# Patient Record
Sex: FEMALE | URBAN - METROPOLITAN AREA
[De-identification: names, ages, dates, MRNs, and addresses within clinical notes are randomized per-mention and may not be internally consistent; named-entity substitution may affect disease eponyms.]

---

## 2022-12-20 ENCOUNTER — OFFICE VISIT (OUTPATIENT)
Dept: FAMILY MEDICINE CLINIC | Age: 32
End: 2022-12-20

## 2022-12-20 VITALS
TEMPERATURE: 97.3 F | WEIGHT: 185 LBS | RESPIRATION RATE: 16 BRPM | SYSTOLIC BLOOD PRESSURE: 110 MMHG | DIASTOLIC BLOOD PRESSURE: 76 MMHG | OXYGEN SATURATION: 98 % | HEART RATE: 91 BPM | BODY MASS INDEX: 29.73 KG/M2 | HEIGHT: 66 IN

## 2022-12-20 DIAGNOSIS — Z11.4 ENCOUNTER FOR SCREENING FOR HIV: ICD-10-CM

## 2022-12-20 DIAGNOSIS — F17.200 SMOKER: ICD-10-CM

## 2022-12-20 DIAGNOSIS — Z76.89 ENCOUNTER TO ESTABLISH CARE: ICD-10-CM

## 2022-12-20 DIAGNOSIS — Z11.59 NEED FOR HEPATITIS C SCREENING TEST: ICD-10-CM

## 2022-12-20 DIAGNOSIS — M25.511 CHRONIC PAIN OF BOTH SHOULDERS: ICD-10-CM

## 2022-12-20 DIAGNOSIS — G89.29 CHRONIC BILATERAL LOW BACK PAIN WITH BILATERAL SCIATICA: ICD-10-CM

## 2022-12-20 DIAGNOSIS — M25.512 CHRONIC PAIN OF BOTH SHOULDERS: ICD-10-CM

## 2022-12-20 DIAGNOSIS — E78.5 HYPERLIPIDEMIA, UNSPECIFIED HYPERLIPIDEMIA TYPE: ICD-10-CM

## 2022-12-20 DIAGNOSIS — Z00.00 ROUTINE ADULT HEALTH MAINTENANCE: Primary | ICD-10-CM

## 2022-12-20 DIAGNOSIS — F41.9 ANXIETY AND DEPRESSION: ICD-10-CM

## 2022-12-20 DIAGNOSIS — Z23 NEED FOR TDAP VACCINATION: ICD-10-CM

## 2022-12-20 DIAGNOSIS — G89.29 CHRONIC PAIN OF BOTH SHOULDERS: ICD-10-CM

## 2022-12-20 DIAGNOSIS — M54.41 CHRONIC BILATERAL LOW BACK PAIN WITH BILATERAL SCIATICA: ICD-10-CM

## 2022-12-20 DIAGNOSIS — F32.A ANXIETY AND DEPRESSION: ICD-10-CM

## 2022-12-20 DIAGNOSIS — Z23 FLU VACCINE NEED: ICD-10-CM

## 2022-12-20 DIAGNOSIS — M72.2 PLANTAR FASCIITIS: ICD-10-CM

## 2022-12-20 DIAGNOSIS — M54.42 CHRONIC BILATERAL LOW BACK PAIN WITH BILATERAL SCIATICA: ICD-10-CM

## 2022-12-20 LAB — HEPATITIS C ANTIBODY INTERPRETATION: NORMAL

## 2022-12-20 RX ORDER — BUSPIRONE HYDROCHLORIDE 15 MG/1
TABLET ORAL
COMMUNITY
Start: 2022-06-27 | End: 2022-12-20 | Stop reason: SDUPTHER

## 2022-12-20 RX ORDER — HYDROXYZINE PAMOATE 25 MG/1
25 CAPSULE ORAL 3 TIMES DAILY PRN
Qty: 90 CAPSULE | Refills: 0 | Status: SHIPPED | OUTPATIENT
Start: 2022-12-20

## 2022-12-20 RX ORDER — BUSPIRONE HYDROCHLORIDE 15 MG/1
15 TABLET ORAL 2 TIMES DAILY
Qty: 90 TABLET | Refills: 0 | Status: SHIPPED | OUTPATIENT
Start: 2022-12-20

## 2022-12-20 RX ORDER — SERTRALINE HYDROCHLORIDE 100 MG/1
100 TABLET, FILM COATED ORAL DAILY
COMMUNITY
Start: 2022-07-06 | End: 2022-12-20 | Stop reason: SDUPTHER

## 2022-12-20 RX ORDER — HYDROXYZINE PAMOATE 25 MG/1
25 CAPSULE ORAL 3 TIMES DAILY PRN
COMMUNITY
Start: 2022-06-03 | End: 2022-12-20 | Stop reason: SDUPTHER

## 2022-12-20 RX ORDER — MELOXICAM 7.5 MG/1
7.5 TABLET ORAL DAILY PRN
Qty: 30 TABLET | Refills: 0 | Status: SHIPPED | OUTPATIENT
Start: 2022-12-20

## 2022-12-20 RX ORDER — SERTRALINE HYDROCHLORIDE 100 MG/1
100 TABLET, FILM COATED ORAL DAILY
Qty: 45 TABLET | Refills: 0 | Status: SHIPPED | OUTPATIENT
Start: 2022-12-20

## 2022-12-20 RX ORDER — CYCLOBENZAPRINE HCL 10 MG
10 TABLET ORAL NIGHTLY PRN
Qty: 30 TABLET | Refills: 0 | Status: SHIPPED | OUTPATIENT
Start: 2022-12-20

## 2022-12-20 SDOH — ECONOMIC STABILITY: FOOD INSECURITY: WITHIN THE PAST 12 MONTHS, THE FOOD YOU BOUGHT JUST DIDN'T LAST AND YOU DIDN'T HAVE MONEY TO GET MORE.: NEVER TRUE

## 2022-12-20 SDOH — ECONOMIC STABILITY: HOUSING INSECURITY
IN THE LAST 12 MONTHS, WAS THERE A TIME WHEN YOU DID NOT HAVE A STEADY PLACE TO SLEEP OR SLEPT IN A SHELTER (INCLUDING NOW)?: NO

## 2022-12-20 SDOH — ECONOMIC STABILITY: TRANSPORTATION INSECURITY
IN THE PAST 12 MONTHS, HAS LACK OF TRANSPORTATION KEPT YOU FROM MEETINGS, WORK, OR FROM GETTING THINGS NEEDED FOR DAILY LIVING?: NO

## 2022-12-20 SDOH — ECONOMIC STABILITY: TRANSPORTATION INSECURITY
IN THE PAST 12 MONTHS, HAS THE LACK OF TRANSPORTATION KEPT YOU FROM MEDICAL APPOINTMENTS OR FROM GETTING MEDICATIONS?: NO

## 2022-12-20 SDOH — ECONOMIC STABILITY: FOOD INSECURITY: WITHIN THE PAST 12 MONTHS, YOU WORRIED THAT YOUR FOOD WOULD RUN OUT BEFORE YOU GOT MONEY TO BUY MORE.: NEVER TRUE

## 2022-12-20 SDOH — ECONOMIC STABILITY: INCOME INSECURITY: IN THE LAST 12 MONTHS, WAS THERE A TIME WHEN YOU WERE NOT ABLE TO PAY THE MORTGAGE OR RENT ON TIME?: NO

## 2022-12-20 ASSESSMENT — ANXIETY QUESTIONNAIRES
IF YOU CHECKED OFF ANY PROBLEMS ON THIS QUESTIONNAIRE, HOW DIFFICULT HAVE THESE PROBLEMS MADE IT FOR YOU TO DO YOUR WORK, TAKE CARE OF THINGS AT HOME, OR GET ALONG WITH OTHER PEOPLE: EXTREMELY DIFFICULT
3. WORRYING TOO MUCH ABOUT DIFFERENT THINGS: 3
1. FEELING NERVOUS, ANXIOUS, OR ON EDGE: 3
4. TROUBLE RELAXING: 3
2. NOT BEING ABLE TO STOP OR CONTROL WORRYING: 1
6. BECOMING EASILY ANNOYED OR IRRITABLE: 3
5. BEING SO RESTLESS THAT IT IS HARD TO SIT STILL: 3
7. FEELING AFRAID AS IF SOMETHING AWFUL MIGHT HAPPEN: 3
GAD7 TOTAL SCORE: 19

## 2022-12-20 ASSESSMENT — PATIENT HEALTH QUESTIONNAIRE - PHQ9
SUM OF ALL RESPONSES TO PHQ QUESTIONS 1-9: 24
SUM OF ALL RESPONSES TO PHQ QUESTIONS 1-9: 24
SUM OF ALL RESPONSES TO PHQ9 QUESTIONS 1 & 2: 6
4. FEELING TIRED OR HAVING LITTLE ENERGY: 3
10. IF YOU CHECKED OFF ANY PROBLEMS, HOW DIFFICULT HAVE THESE PROBLEMS MADE IT FOR YOU TO DO YOUR WORK, TAKE CARE OF THINGS AT HOME, OR GET ALONG WITH OTHER PEOPLE: 3
3. TROUBLE FALLING OR STAYING ASLEEP: 3
7. TROUBLE CONCENTRATING ON THINGS, SUCH AS READING THE NEWSPAPER OR WATCHING TELEVISION: 3
5. POOR APPETITE OR OVEREATING: 3
2. FEELING DOWN, DEPRESSED OR HOPELESS: 3
8. MOVING OR SPEAKING SO SLOWLY THAT OTHER PEOPLE COULD HAVE NOTICED. OR THE OPPOSITE, BEING SO FIGETY OR RESTLESS THAT YOU HAVE BEEN MOVING AROUND A LOT MORE THAN USUAL: 3
SUM OF ALL RESPONSES TO PHQ QUESTIONS 1-9: 24
SUM OF ALL RESPONSES TO PHQ QUESTIONS 1-9: 24
6. FEELING BAD ABOUT YOURSELF - OR THAT YOU ARE A FAILURE OR HAVE LET YOURSELF OR YOUR FAMILY DOWN: 3
9. THOUGHTS THAT YOU WOULD BE BETTER OFF DEAD, OR OF HURTING YOURSELF: 0
1. LITTLE INTEREST OR PLEASURE IN DOING THINGS: 3

## 2022-12-20 ASSESSMENT — ENCOUNTER SYMPTOMS
CONSTIPATION: 0
TROUBLE SWALLOWING: 0
NAUSEA: 0
DIARRHEA: 0
WHEEZING: 0
VOMITING: 0
BACK PAIN: 1
SHORTNESS OF BREATH: 0
BLOOD IN STOOL: 0
COUGH: 0
ABDOMINAL PAIN: 0

## 2022-12-20 ASSESSMENT — COLUMBIA-SUICIDE SEVERITY RATING SCALE - C-SSRS
2. HAVE YOU ACTUALLY HAD ANY THOUGHTS OF KILLING YOURSELF?: NO
1. WITHIN THE PAST MONTH, HAVE YOU WISHED YOU WERE DEAD OR WISHED YOU COULD GO TO SLEEP AND NOT WAKE UP?: YES
6. HAVE YOU EVER DONE ANYTHING, STARTED TO DO ANYTHING, OR PREPARED TO DO ANYTHING TO END YOUR LIFE?: NO

## 2022-12-20 ASSESSMENT — SOCIAL DETERMINANTS OF HEALTH (SDOH): HOW HARD IS IT FOR YOU TO PAY FOR THE VERY BASICS LIKE FOOD, HOUSING, MEDICAL CARE, AND HEATING?: SOMEWHAT HARD

## 2022-12-20 NOTE — PROGRESS NOTES
Emily Anna  YOB: 1990    Date of Service:  12/20/2022    Chief Complaint:   Emily Anna is a 34 y.o. female who presents for complete physical examination. Concerns:   Chief Complaint   Patient presents with    New Patient    Back Pain     Left side and foot pain does go to both sides       HPI:  34year old female presents with multiple concerns and to establish care. Back pain  Patient endorses back pain that has been present since October 2022. Radiates from her neck down to her lower extremities. Recently started a new job which involves more bending and reaching. Problem occurs constantly worse on the left side. Has been worsening since onset. Present and thoracic, lumbar and sacroiliac spine. Described as a constant ache. Pain radiates from lower back to right and left thigh. Pain is rated 7 out of 10 in severity. Worse during the night. Worsens with bending, position and standing. Endorses paresthesias and tingling in her arms and forearms. Her extremities. Denies any abdominal pain, bowel or bladder incontinence with pain, dysuria, fever, eye pain, pelvic pain, perianal numbness and weight loss. She has tried NSAIDs and heat for symptoms. Treatment provided little improvement in pain    Foot pain  Patient endorses a history of plantar fasciitis 9 months ago. Worse in her left foot. Patient has tried inserts in her shoes and massage. Pain is rated 10 out of 10 in severity. Anxiety and depression   History of anxiety diagnosed in her 25s. Previously on Zoloft 100 mg, Buspar 15 mg twice daily and hydroxyzine PRN for panic attacks. Patient reports he has been doing well on the medications. Denies any side effects. Has been on the medication for at least 2 to 3 months. Currently not taking any medications. Denies any suicidal or homicidal ideation. Date of last physical: over 1 year.      Recent illnesses/hospitalizations - none   Tobacco - 1/2 ppd, 12 years   Alcohol - 3 drinks/week   Drugs - Marijuana daily   Mood - phq 2 of 6  PHQ-9  12/20/2022   Little interest or pleasure in doing things 3   Feeling down, depressed, or hopeless 3   Trouble falling or staying asleep, or sleeping too much 3   Feeling tired or having little energy 3   Poor appetite or overeating 3   Feeling bad about yourself - or that you are a failure or have let yourself or your family down 3   Trouble concentrating on things, such as reading the newspaper or watching television 3   Moving or speaking so slowly that other people could have noticed. Or the opposite - being so fidgety or restless that you have been moving around a lot more than usual 3   Thoughts that you would be better off dead, or of hurting yourself in some way 0   PHQ-2 Score 6   PHQ-9 Total Score 24   If you checked off any problems, how difficult have these problems made it for you to do your work, take care of things at home, or get along with other people? 3     Sexually active - not currently   Menses - Patient's last menstrual period was 11/23/2022 (approximate). Regular periods   History of contraception - never   Diet - Balanced, none   Exercise - works   Occupation: Works in a [employer]  Lives at home with 2 roommates  Body mass index is 30.32 kg/m². Health Maintenance   HIV screen - Never   Hep C Screen - Never   Hx abnormal PAP: no, Her last pap smear was > 5 years  Last tetanus booster:   Last eye exam: > 1 year  Last dental exam: >1 year    Vaccines  Tdap vaccine > 10 years   COVID 19-vaccine- JJ 1 dose   Flu vaccine - none      Patient's medications, allergies, past medical, surgical, social and family histories were reviewed and updated as appropriate. Wt Readings from Last 3 Encounters:   12/20/22 185 lb (83.9 kg)     BP Readings from Last 3 Encounters:   12/20/22 110/76     No Known Allergies  History reviewed. No pertinent past medical history.   Outpatient Medications Marked as Taking for the 12/20/22 encounter (Office Visit) with Jacinta Felipe MD   Medication Sig Dispense Refill    nicotine polacrilex (NICORETTE) 4 MG gum Take 1 each by mouth every 2 hours as needed for Smoking cessation 190 each 0    busPIRone (BUSPAR) 15 MG tablet Take 15 mg by mouth in the morning and at bedtime 90 tablet 0    hydrOXYzine pamoate (VISTARIL) 25 MG capsule Take 1 capsule by mouth 3 times daily as needed for Anxiety 90 capsule 0    sertraline (ZOLOFT) 100 MG tablet Take 1 tablet by mouth daily 45 tablet 0    cyclobenzaprine (FLEXERIL) 10 MG tablet Take 1 tablet by mouth nightly as needed for Muscle spasms (may cause drowsiness) 30 tablet 0    meloxicam (MOBIC) 7.5 MG tablet Take 1 tablet by mouth daily as needed for Pain 30 tablet 0     There is no problem list on file for this patient.     Health Maintenance   Topic Date Due    Varicella vaccine (1 of 2 - 2-dose childhood series) Never done    Pneumococcal 0-64 years Vaccine (1 - PCV) Never done    Cervical cancer screen  Never done    COVID-19 Vaccine (2 - Booster for Rafi series) 07/23/2021    Depression Monitoring  12/20/2023    DTaP/Tdap/Td vaccine (8 - Td or Tdap) 12/20/2032    Hib vaccine  Completed    Flu vaccine  Completed    Hepatitis C screen  Completed    HIV screen  Completed    Hepatitis A vaccine  Aged Out    Meningococcal (ACWY) vaccine  Aged Out     Immunization History   Administered Date(s) Administered    COVID-19, J&J, (age 18y+), IM, 0.5 mL 05/28/2021    DTP 02/22/1990, 04/23/1990, 06/26/1990, 05/31/1991, 07/17/1995    Hepatitis B Ped/Adol (Engerix-B, Recombivax HB) 05/16/2002, 07/03/2002, 10/28/2002    Hib vaccine 02/28/1991    Influenza, FLUARIX, FLULAVAL, FLUZONE (age 10 mo+) AND AFLURIA, (age 1 y+), PF, 0.5mL 10/24/2018, 11/26/2019    Influenza, FLUCELVAX, (age 10 mo+), MDCK, PF, 0.5mL 12/20/2022    MMR 06/14/1991, 05/16/2002    Polio IPV (IPOL) 1989, 02/13/1990, 08/01/1991, 08/23/1995    Td vaccine (adult) 11/25/2002    Tdap (Boostrix, Adacel) 03/08/2019, 12/20/2022    Tetanus Toxoid, absorbed 09/08/2003       History reviewed. No pertinent surgical history. History reviewed. No pertinent family history. Social History     Socioeconomic History    Marital status: Single     Spouse name: Not on file    Number of children: Not on file    Years of education: Not on file    Highest education level: Not on file   Occupational History    Not on file   Tobacco Use    Smoking status: Every Day     Packs/day: 0.50     Types: Cigarettes     Start date: 12/5/2010    Smokeless tobacco: Never   Substance and Sexual Activity    Alcohol use: Yes     Alcohol/week: 3.0 standard drinks     Types: 3 Cans of beer per week     Comment: 3 cans per week    Drug use: Yes     Types: Marijuana Garon Johnson)    Sexual activity: Not on file   Other Topics Concern    Not on file   Social History Narrative    Not on file     Social Determinants of Health     Financial Resource Strain: Medium Risk    Difficulty of Paying Living Expenses: Somewhat hard   Food Insecurity: No Food Insecurity    Worried About Running Out of Food in the Last Year: Never true    Ran Out of Food in the Last Year: Never true   Transportation Needs: No Transportation Needs    Lack of Transportation (Medical): No    Lack of Transportation (Non-Medical): No   Physical Activity: Not on file   Stress: Not on file   Social Connections: Not on file   Intimate Partner Violence: Not on file   Housing Stability: Unknown    Unable to Pay for Housing in the Last Year: No    Number of Places Lived in the Last Year: Not on file    Unstable Housing in the Last Year: No       Reviewof Systems:  Review of Systems   Constitutional:  Positive for fatigue. Negative for diaphoresis, fever and unexpected weight change. HENT:  Negative for ear pain, hearing loss and trouble swallowing. Eyes:  Negative for visual disturbance. Respiratory:  Negative for cough, shortness of breath and wheezing.     Cardiovascular: Negative for chest pain, palpitations and leg swelling. Gastrointestinal:  Negative for abdominal pain, blood in stool, constipation, diarrhea, nausea and vomiting. Genitourinary:  Negative for dysuria, pelvic pain, vaginal bleeding and vaginal discharge. Musculoskeletal:  Positive for back pain. Negative for arthralgias and joint swelling. Skin:  Negative for rash. Neurological:  Positive for weakness. Negative for dizziness, light-headedness and numbness. Psychiatric/Behavioral:  Positive for decreased concentration and sleep disturbance (5-6 hours). Negative for hallucinations, self-injury and suicidal ideas. The patient is nervous/anxious. PhysicalExam:   /76   Pulse 91   Temp 97.3 °F (36.3 °C)   Resp 16   Ht 5' 5.5\" (1.664 m)   Wt 185 lb (83.9 kg)   LMP 11/23/2022 (Approximate)   SpO2 98%   BMI 30.32 kg/m²   Physical Exam  Constitutional:       General: She is not in acute distress. Appearance: Normal appearance. She is not ill-appearing. HENT:      Head: Normocephalic. Right Ear: External ear normal.      Left Ear: External ear normal.      Nose: Nose normal.      Mouth/Throat:      Mouth: Mucous membranes are moist.      Pharynx: No posterior oropharyngeal erythema. Eyes:      Extraocular Movements: Extraocular movements intact. Conjunctiva/sclera: Conjunctivae normal.      Pupils: Pupils are equal, round, and reactive to light. Neck:      Thyroid: No thyroid mass, thyromegaly or thyroid tenderness. Cardiovascular:      Rate and Rhythm: Normal rate and regular rhythm. Pulses: Normal pulses. Heart sounds: Normal heart sounds. No murmur heard. No gallop. Pulmonary:      Effort: Pulmonary effort is normal. No respiratory distress. Breath sounds: Normal breath sounds. No wheezing or rales. Abdominal:      General: Abdomen is flat. Bowel sounds are normal. There is no distension. Palpations: Abdomen is soft. There is no mass. Tenderness: There is no abdominal tenderness. There is no guarding. Genitourinary:     Comments: deferred  Musculoskeletal:         General: No swelling or tenderness. Normal range of motion. Cervical back: Normal range of motion. Lymphadenopathy:      Cervical: No cervical adenopathy. Skin:     General: Skin is warm. Capillary Refill: Capillary refill takes less than 2 seconds. Findings: No rash. Neurological:      General: No focal deficit present. Mental Status: She is alert. Motor: No weakness. Psychiatric:         Mood and Affect: Mood normal.         Behavior: Behavior normal.         Thought Content: Thought content normal.   No results found for: WBC, HGB, HCT, MCV, PLT      Assessment/Plan:       Routine adult health maintenance  Encounter to establish care  -Chart/records reviewed, history and physical performed, health maintenance addressed and updated, presenting problems addressed. -Recommend 150 minutes of cardiovascular activity a week, or 10,000 to 15,000 steps a day, 2 days of weightbearing  -Encourage healthy diet,avoid processed/refined carbohydrates   Health Maintenance Due   Topic Date Due    Varicella vaccine (1 of 2 - 2-dose childhood series) Never done    Pneumococcal 0-64 years Vaccine (1 - PCV) Never done    Cervical cancer screen  Never done    COVID-19 Vaccine (2 - Booster for Rafi series) 07/23/2021     HIV screen - Never   Hep C Screen - Never   Last eye exam: > 1 year  Last dental exam: >1 year    Vaccines  Tdap vaccine > 10 years   COVID 19-vaccine- JJ 1 dose   Flu vaccine - none   Labs from 6/15/2022 showed normal CBC, CMP,, hemoglobin A1c is 5.8 TSH, FANY, rheumatoid factor, CPK,  Lipid panel was elevated total cholesterol 250, triglycerides 335, HDL 49,     Plantar fasciitis  Bilateral foot pain worse on the left.   Has tried over-the-counter orthotics  Referral to podiatry  -     [address], Jennifer DOHERTY DPM, Podiatry, [facility]  Hyperlipidemia, unspecified hyperlipidemia type  Lipid panel was elevated total cholesterol 250, triglycerides 335, HDL 49,  (6/2022)  Discussed healthy diet and exercise. Limit saturated fats in diet. Smoker  Advised patient to quit and offered support. Educational material provided to patient. Recommended nicotine gum, reviewed use and dosing.     -     nicotine polacrilex (NICORETTE) 4 MG gum; Take 1 each by mouth every 2 hours as needed for Smoking cessation, Disp-190 each, R-0Normal  Encounter for screening for HIV  -     HIV Screen; Future  Need for hepatitis C screening test  -     Hepatitis C Antibody; Future  Need for Tdap vaccination  -     Tdap, BOOSTRIX, (age 8 yrs+), IM  Flu vaccine need  -     Influenza, FLUCELVAX, (age 10 mo+), IM, Preservative Free, 0.5 mL  Chronic bilateral low back pain with bilateral sciatica  Acute on chronic. Most likely strain vs spasm. W/o radiculopathy. No red flags.  - Failed Tylenol or NSAIDS as first line tx.    - ?? ?? Short term use of muscle relaxant. ?  - Referral to physical therapy. - Physical activity as tolerated. encouraged pt to stay active. - Printed back exercises handout. -     cyclobenzaprine (FLEXERIL) 10 MG tablet; Take 1 tablet by mouth nightly as needed for Muscle spasms (may cause drowsiness), Disp-30 tablet, R-0Normal  -     [facility]  Chronic pain of both shoulders  Activity as tolerated, rest, ice, elevate, (RICE) your affected joint as direct until resolution. Meloxicam for pain. Take with food to help minimize stomach upset. Range of motion exercises as tolerated. Referral to physical therapy  -     [facility]  -     meloxicam (MOBIC) 7.5 MG tablet;  Take 1 tablet by mouth daily as needed for Pain, Disp-30 tablet, R-0Normal  Anxiety and depression  On the basis of positive PHQ-9 screening (PHQ-9 Total Score: 24), the following plan was implemented: additional evaluation and assessment performed, follow-up plan includes but not limited to: Medication management and Referral to /Specialist for evaluation and management. Patient will follow-up in 1 month(s) with PCP. Restart previous medications sertraline, hydroxyzine, BuSpar  Referral for psychiatry      -     [facility]  -     busPIRone (BUSPAR) 15 MG tablet; Take 15 mg by mouth in the morning and at bedtime, Disp-90 tablet, R-0Normal  -     hydrOXYzine pamoate (VISTARIL) 25 MG capsule; Take 1 capsule by mouth 3 times daily as needed for Anxiety, Disp-90 capsule, R-0Normal  -     sertraline (ZOLOFT) 100 MG tablet; Take 1 tablet by mouth daily, Disp-45 tablet, R-0Normal             ICD-10. Return in about 4 weeks (around 1/17/2023) for Anxiety and depression . Patient Instructions   - Take nicotene gum as directed   - Follow up 1 month for Depression and Anxiety   - MELOXICAM ONCE DAILY , stop Aleve    - Flexeril as needed   - PT referral     Prior to Visit Medications    Medication Sig Taking? Authorizing Provider   nicotine polacrilex (NICORETTE) 4 MG gum Take 1 each by mouth every 2 hours as needed for Smoking cessation Yes Anna Pena MD   busPIRone (BUSPAR) 15 MG tablet Take 15 mg by mouth in the morning and at bedtime Yes Anna Pena MD   hydrOXYzine pamoate (VISTARIL) 25 MG capsule Take 1 capsule by mouth 3 times daily as needed for Anxiety Yes Anna Pena MD   sertraline (ZOLOFT) 100 MG tablet Take 1 tablet by mouth daily Yes Anna Pena MD   cyclobenzaprine (FLEXERIL) 10 MG tablet Take 1 tablet by mouth nightly as needed for Muscle spasms (may cause drowsiness) Yes Anna Pena MD   meloxicam (MOBIC) 7.5 MG tablet Take 1 tablet by mouth daily as needed for Pain Yes Anna Pena MD       An electronic signature was used to authenticate this note.     --Anna Pena MD on 12/20/2022

## 2022-12-20 NOTE — PATIENT INSTRUCTIONS
- Take nicotene gum as directed   - Follow up 1 month for Depression and Anxiety   - MELOXICAM ONCE DAILY , stop Aleve    - Flexeril as needed   - PT referral

## 2022-12-21 LAB
HIV AG/AB: NORMAL
HIV ANTIGEN: NORMAL
HIV-1 ANTIBODY: NORMAL
HIV-2 AB: NORMAL

## 2023-01-11 DIAGNOSIS — F41.9 ANXIETY AND DEPRESSION: ICD-10-CM

## 2023-01-11 DIAGNOSIS — F32.A ANXIETY AND DEPRESSION: ICD-10-CM

## 2023-01-11 RX ORDER — HYDROXYZINE PAMOATE 25 MG/1
25 CAPSULE ORAL 3 TIMES DAILY PRN
Qty: 90 CAPSULE | Refills: 0 | Status: SHIPPED | OUTPATIENT
Start: 2023-01-11

## 2023-01-20 ENCOUNTER — OFFICE VISIT (OUTPATIENT)
Dept: FAMILY MEDICINE CLINIC | Age: 33
End: 2023-01-20
Payer: COMMERCIAL

## 2023-01-20 VITALS
TEMPERATURE: 97.1 F | HEART RATE: 103 BPM | BODY MASS INDEX: 30.82 KG/M2 | DIASTOLIC BLOOD PRESSURE: 64 MMHG | OXYGEN SATURATION: 97 % | WEIGHT: 185 LBS | SYSTOLIC BLOOD PRESSURE: 96 MMHG | HEIGHT: 65 IN

## 2023-01-20 DIAGNOSIS — M54.41 CHRONIC BILATERAL LOW BACK PAIN WITH BILATERAL SCIATICA: Primary | ICD-10-CM

## 2023-01-20 DIAGNOSIS — M25.511 CHRONIC PAIN OF BOTH SHOULDERS: ICD-10-CM

## 2023-01-20 DIAGNOSIS — F32.A ANXIETY AND DEPRESSION: ICD-10-CM

## 2023-01-20 DIAGNOSIS — M25.512 CHRONIC PAIN OF BOTH SHOULDERS: ICD-10-CM

## 2023-01-20 DIAGNOSIS — M54.42 CHRONIC BILATERAL LOW BACK PAIN WITH BILATERAL SCIATICA: Primary | ICD-10-CM

## 2023-01-20 DIAGNOSIS — G89.29 CHRONIC BILATERAL LOW BACK PAIN WITH BILATERAL SCIATICA: Primary | ICD-10-CM

## 2023-01-20 DIAGNOSIS — G89.29 CHRONIC PAIN OF BOTH SHOULDERS: ICD-10-CM

## 2023-01-20 DIAGNOSIS — F41.9 ANXIETY AND DEPRESSION: ICD-10-CM

## 2023-01-20 PROCEDURE — 99214 OFFICE O/P EST MOD 30 MIN: CPT | Performed by: STUDENT IN AN ORGANIZED HEALTH CARE EDUCATION/TRAINING PROGRAM

## 2023-01-20 RX ORDER — SERTRALINE HYDROCHLORIDE 100 MG/1
100 TABLET, FILM COATED ORAL DAILY
Qty: 90 TABLET | Refills: 0 | Status: SHIPPED | OUTPATIENT
Start: 2023-01-20

## 2023-01-20 RX ORDER — MELOXICAM 7.5 MG/1
7.5 TABLET ORAL DAILY PRN
Qty: 90 TABLET | Refills: 0 | Status: SHIPPED | OUTPATIENT
Start: 2023-01-20

## 2023-01-20 RX ORDER — HYDROXYZINE PAMOATE 25 MG/1
25 CAPSULE ORAL 3 TIMES DAILY PRN
Qty: 180 CAPSULE | Refills: 0 | Status: SHIPPED | OUTPATIENT
Start: 2023-01-20

## 2023-01-20 ASSESSMENT — PATIENT HEALTH QUESTIONNAIRE - PHQ9
3. TROUBLE FALLING OR STAYING ASLEEP: 2
6. FEELING BAD ABOUT YOURSELF - OR THAT YOU ARE A FAILURE OR HAVE LET YOURSELF OR YOUR FAMILY DOWN: 3
SUM OF ALL RESPONSES TO PHQ QUESTIONS 1-9: 20
1. LITTLE INTEREST OR PLEASURE IN DOING THINGS: 3
4. FEELING TIRED OR HAVING LITTLE ENERGY: 3
SUM OF ALL RESPONSES TO PHQ9 QUESTIONS 1 & 2: 5
8. MOVING OR SPEAKING SO SLOWLY THAT OTHER PEOPLE COULD HAVE NOTICED. OR THE OPPOSITE, BEING SO FIGETY OR RESTLESS THAT YOU HAVE BEEN MOVING AROUND A LOT MORE THAN USUAL: 2
2. FEELING DOWN, DEPRESSED OR HOPELESS: 2
9. THOUGHTS THAT YOU WOULD BE BETTER OFF DEAD, OR OF HURTING YOURSELF: 0
7. TROUBLE CONCENTRATING ON THINGS, SUCH AS READING THE NEWSPAPER OR WATCHING TELEVISION: 3
5. POOR APPETITE OR OVEREATING: 2
SUM OF ALL RESPONSES TO PHQ QUESTIONS 1-9: 20
10. IF YOU CHECKED OFF ANY PROBLEMS, HOW DIFFICULT HAVE THESE PROBLEMS MADE IT FOR YOU TO DO YOUR WORK, TAKE CARE OF THINGS AT HOME, OR GET ALONG WITH OTHER PEOPLE: 2

## 2023-01-20 ASSESSMENT — COLUMBIA-SUICIDE SEVERITY RATING SCALE - C-SSRS
2. HAVE YOU ACTUALLY HAD ANY THOUGHTS OF KILLING YOURSELF?: NO
6. HAVE YOU EVER DONE ANYTHING, STARTED TO DO ANYTHING, OR PREPARED TO DO ANYTHING TO END YOUR LIFE?: NO
1. WITHIN THE PAST MONTH, HAVE YOU WISHED YOU WERE DEAD OR WISHED YOU COULD GO TO SLEEP AND NOT WAKE UP?: NO

## 2023-01-20 ASSESSMENT — ENCOUNTER SYMPTOMS
NAUSEA: 0
SHORTNESS OF BREATH: 1

## 2023-01-20 NOTE — PROGRESS NOTES
[address], [address] [place] [address]  Tel: [phone]      2023     Tanja Feliz (:  1990) is a 34 y.o. female, here for evaluation of the following medical concerns:  Chief Complaint   Patient presents with    1 Month Follow-Up     Doing ok on medicine / anxiety and depression       HPI  Patient is a 57-year-old female who presents for follow-up on depression and anxiety    Anxiety and depression   History of anxiety diagnosed in her 25s. During her last visit a month ago, patient was restarted on BuSpar 15 mg twice daily and hydroxyzine 25 mg as needed. She is also taking sertraline 100 mg daily. Patient reports that her mood has been stable on current medications. Satisfied with current doses. Denies any side effects. No GI discomfort, headaches, joint pain, rash, visionary or urinary symptoms. Endorses occasional depressed mood, anxiety and concentration. Denies any chest pain, compulsions, confusion, dizziness, dry mouth, hyperventilation, insomnia, nausea, palpitations, restlessness or suicidal ideations. Denies any suicidal or homicidal ideation. Patient would like to start medication for ADHD, has tried [facility]. Has trouble scheduling. Endorses trouble concentrating at home and at work. Sleep fluctuates, 7 hours to 10 hours. Nonrestorative,        Saw podiatrists, had steroid injection in both feet. Still feels persistent mass deep inside tissue. Denies any redness or swelling in her foot. Scheduled for 1 month follow up with podiatry. Nicotine gum causes hiccups. Smoking 3-5 cigarettes daily. Reports meloxicam is helping control her pain. Review of Systems   Respiratory:  Positive for shortness of breath (intermittent - smoking). Cardiovascular:  Negative for chest pain and palpitations. Gastrointestinal:  Negative for nausea. Neurological:  Negative for dizziness. Psychiatric/Behavioral:  Positive for decreased concentration. Negative for confusion and suicidal ideas. The patient is nervous/anxious. Prior to Visit Medications    Medication Sig Taking? Authorizing Provider   hydrOXYzine pamoate (VISTARIL) 25 MG capsule TAKE 1 CAPSULE BY MOUTH 3 TIMES DAILY AS NEEDED FOR ANXIETY. Yes Lexi Pantoja MD   nicotine polacrilex (NICORETTE) 4 MG gum Take 1 each by mouth every 2 hours as needed for Smoking cessation Yes Lexi Pantoja MD   busPIRone (BUSPAR) 15 MG tablet Take 15 mg by mouth in the morning and at bedtime Yes Lexi Pantoja MD   sertraline (ZOLOFT) 100 MG tablet Take 1 tablet by mouth daily Yes Lexi Pantoja MD   cyclobenzaprine (FLEXERIL) 10 MG tablet Take 1 tablet by mouth nightly as needed for Muscle spasms (may cause drowsiness) Yes Lexi Pantoja MD   meloxicam (MOBIC) 7.5 MG tablet Take 1 tablet by mouth daily as needed for Pain Yes Lexi Pantoja MD        Social History     Tobacco Use    Smoking status: Every Day     Packs/day: 0.50     Types: Cigarettes     Start date: 12/5/2010    Smokeless tobacco: Never   Substance Use Topics    Alcohol use: Yes     Alcohol/week: 3.0 standard drinks     Types: 3 Cans of beer per week     Comment: 3 cans per week      Physical exam  BP 96/64 (Site: Left Upper Arm, Position: Sitting, Cuff Size: Large Adult)   Pulse (!) 103   Temp 97.1 °F (36.2 °C)   Ht 5' 5\" (1.651 m)   Wt 185 lb (83.9 kg)   LMP 01/19/2023   SpO2 97%   BMI 30.79 kg/m²     Physical Exam  Constitutional:       General: She is not in acute distress. HENT:      Head: Normocephalic. Cardiovascular:      Rate and Rhythm: Normal rate and regular rhythm. Pulmonary:      Effort: Pulmonary effort is normal.      Breath sounds: Normal breath sounds. Abdominal:      General: Abdomen is flat. Musculoskeletal:         General: No swelling. Skin:     General: Skin is dry. Neurological:      General: No focal deficit present.    Psychiatric: Attention and Perception: Attention and perception normal.         Mood and Affect: Mood normal.         Speech: Speech normal.         Behavior: Behavior normal. Behavior is cooperative. Thought Content: Thought content normal.         Cognition and Memory: Cognition and memory normal.         Judgment: Judgment normal.       ASSESSMENT/PLAN:    Anxiety and depression  PHQ-9 Total Score: 20 (1/20/2023 10:07 AM)  Thoughts that you would be better off dead, or of hurting yourself in some way: 0 (1/20/2023 10:07 AM)  Stable on current medications. Meds: BuSpar 15 mg twice daily, hydroxyzine 25 mg tid as needed, sertraline 100 mg daily. Requesting alternative for referral to psychiatry for ADHD evaluation  Information given for [facility]  -     hydrOXYzine pamoate (VISTARIL) 25 MG capsule; Take 1 capsule by mouth 3 times daily as needed for Anxiety, Disp-180 capsule, R-0Normal  -     sertraline (ZOLOFT) 100 MG tablet; Take 1 tablet by mouth daily, Disp-90 tablet, R-0Normal  Chronic pain of both shoulders  Likely with arthritic pain  Activity as tolerated, rest, ice, elevate, (RICE) your affected joint as direct until resolution. Meloxicam for pain. Take with food to help minimize stomach upset. -     meloxicam (MOBIC) 7.5 MG tablet; Take 1 tablet by mouth daily as needed for Pain, Disp-90 tablet, R-0Normal     Return in about 3 months (around 4/20/2023), or if symptoms worsen or fail to improve, for Depression /anxiety . An electronic signature was used to authenticate this note.     --Lucie Ngo MD on 1/20/2023

## 2023-01-20 NOTE — PATIENT INSTRUCTIONS
- Take meloxicam with food , look out for ulcer   - Continue current medications   - Reach out to other Psychiatrist   - Follow up with podiatry     [facility] in [facility]  Address: [address] [place] [address], [facility] [place]  Phone: [phone]: [employer]. [id number]

## 2023-02-11 DIAGNOSIS — F41.9 ANXIETY AND DEPRESSION: ICD-10-CM

## 2023-02-11 DIAGNOSIS — F32.A ANXIETY AND DEPRESSION: ICD-10-CM

## 2023-02-13 RX ORDER — HYDROXYZINE PAMOATE 25 MG/1
25 CAPSULE ORAL 3 TIMES DAILY PRN
Qty: 90 CAPSULE | Refills: 0 | Status: SHIPPED | OUTPATIENT
Start: 2023-02-13

## 2023-03-07 DIAGNOSIS — F41.9 ANXIETY AND DEPRESSION: ICD-10-CM

## 2023-03-07 DIAGNOSIS — F32.A ANXIETY AND DEPRESSION: ICD-10-CM

## 2023-03-07 RX ORDER — HYDROXYZINE PAMOATE 25 MG/1
25 CAPSULE ORAL 3 TIMES DAILY PRN
Qty: 270 CAPSULE | Refills: 0 | Status: SHIPPED | OUTPATIENT
Start: 2023-03-07

## 2023-03-13 DIAGNOSIS — F41.9 ANXIETY AND DEPRESSION: ICD-10-CM

## 2023-03-13 DIAGNOSIS — F32.A ANXIETY AND DEPRESSION: ICD-10-CM

## 2023-03-13 RX ORDER — HYDROXYZINE PAMOATE 25 MG/1
25 CAPSULE ORAL 3 TIMES DAILY PRN
Qty: 180 CAPSULE | Refills: 0 | OUTPATIENT
Start: 2023-03-13

## 2023-09-06 ENCOUNTER — OFFICE VISIT (OUTPATIENT)
Dept: FAMILY MEDICINE CLINIC | Age: 33
End: 2023-09-06

## 2023-09-06 VITALS
RESPIRATION RATE: 16 BRPM | DIASTOLIC BLOOD PRESSURE: 73 MMHG | WEIGHT: 189 LBS | BODY MASS INDEX: 31.45 KG/M2 | TEMPERATURE: 97.3 F | SYSTOLIC BLOOD PRESSURE: 107 MMHG | OXYGEN SATURATION: 98 % | HEART RATE: 67 BPM

## 2023-09-06 DIAGNOSIS — F32.A ANXIETY AND DEPRESSION: Primary | ICD-10-CM

## 2023-09-06 DIAGNOSIS — M54.41 CHRONIC BILATERAL LOW BACK PAIN WITH BILATERAL SCIATICA: ICD-10-CM

## 2023-09-06 DIAGNOSIS — E78.2 MIXED HYPERLIPIDEMIA: ICD-10-CM

## 2023-09-06 DIAGNOSIS — F41.9 ANXIETY AND DEPRESSION: Primary | ICD-10-CM

## 2023-09-06 DIAGNOSIS — G89.29 CHRONIC BILATERAL LOW BACK PAIN WITH BILATERAL SCIATICA: ICD-10-CM

## 2023-09-06 DIAGNOSIS — M54.42 CHRONIC BILATERAL LOW BACK PAIN WITH BILATERAL SCIATICA: ICD-10-CM

## 2023-09-06 PROCEDURE — 99214 OFFICE O/P EST MOD 30 MIN: CPT | Performed by: STUDENT IN AN ORGANIZED HEALTH CARE EDUCATION/TRAINING PROGRAM

## 2023-09-06 RX ORDER — CYCLOBENZAPRINE HCL 10 MG
10 TABLET ORAL NIGHTLY PRN
Qty: 30 TABLET | Refills: 3 | Status: SHIPPED | OUTPATIENT
Start: 2023-09-06

## 2023-09-06 RX ORDER — SERTRALINE HYDROCHLORIDE 100 MG/1
100 TABLET, FILM COATED ORAL DAILY
Qty: 90 TABLET | Refills: 1 | Status: SHIPPED | OUTPATIENT
Start: 2023-09-06

## 2023-09-06 RX ORDER — BUSPIRONE HYDROCHLORIDE 15 MG/1
15 TABLET ORAL 2 TIMES DAILY
Qty: 60 TABLET | Refills: 5 | Status: SHIPPED | OUTPATIENT
Start: 2023-09-06

## 2023-09-06 RX ORDER — HYDROXYZINE PAMOATE 25 MG/1
25 CAPSULE ORAL 3 TIMES DAILY PRN
Qty: 90 CAPSULE | Refills: 3 | Status: SHIPPED | OUTPATIENT
Start: 2023-09-06

## 2023-09-06 SDOH — ECONOMIC STABILITY: FOOD INSECURITY: WITHIN THE PAST 12 MONTHS, YOU WORRIED THAT YOUR FOOD WOULD RUN OUT BEFORE YOU GOT MONEY TO BUY MORE.: NEVER TRUE

## 2023-09-06 SDOH — ECONOMIC STABILITY: FOOD INSECURITY: WITHIN THE PAST 12 MONTHS, THE FOOD YOU BOUGHT JUST DIDN'T LAST AND YOU DIDN'T HAVE MONEY TO GET MORE.: NEVER TRUE

## 2023-09-06 SDOH — ECONOMIC STABILITY: INCOME INSECURITY: HOW HARD IS IT FOR YOU TO PAY FOR THE VERY BASICS LIKE FOOD, HOUSING, MEDICAL CARE, AND HEATING?: SOMEWHAT HARD

## 2023-09-06 ASSESSMENT — ENCOUNTER SYMPTOMS
NAUSEA: 0
SHORTNESS OF BREATH: 1

## 2023-09-06 ASSESSMENT — PATIENT HEALTH QUESTIONNAIRE - PHQ9
SUM OF ALL RESPONSES TO PHQ QUESTIONS 1-9: 7
6. FEELING BAD ABOUT YOURSELF - OR THAT YOU ARE A FAILURE OR HAVE LET YOURSELF OR YOUR FAMILY DOWN: 0
10. IF YOU CHECKED OFF ANY PROBLEMS, HOW DIFFICULT HAVE THESE PROBLEMS MADE IT FOR YOU TO DO YOUR WORK, TAKE CARE OF THINGS AT HOME, OR GET ALONG WITH OTHER PEOPLE: 1
2. FEELING DOWN, DEPRESSED OR HOPELESS: 1
7. TROUBLE CONCENTRATING ON THINGS, SUCH AS READING THE NEWSPAPER OR WATCHING TELEVISION: 0
SUM OF ALL RESPONSES TO PHQ QUESTIONS 1-9: 2
1. LITTLE INTEREST OR PLEASURE IN DOING THINGS: 1
SUM OF ALL RESPONSES TO PHQ QUESTIONS 1-9: 7
3. TROUBLE FALLING OR STAYING ASLEEP: 1
SUM OF ALL RESPONSES TO PHQ QUESTIONS 1-9: 7
SUM OF ALL RESPONSES TO PHQ QUESTIONS 1-9: 2
8. MOVING OR SPEAKING SO SLOWLY THAT OTHER PEOPLE COULD HAVE NOTICED. OR THE OPPOSITE, BEING SO FIGETY OR RESTLESS THAT YOU HAVE BEEN MOVING AROUND A LOT MORE THAN USUAL: 1
1. LITTLE INTEREST OR PLEASURE IN DOING THINGS: 1
5. POOR APPETITE OR OVEREATING: 2
SUM OF ALL RESPONSES TO PHQ9 QUESTIONS 1 & 2: 2
SUM OF ALL RESPONSES TO PHQ9 QUESTIONS 1 & 2: 2
9. THOUGHTS THAT YOU WOULD BE BETTER OFF DEAD, OR OF HURTING YOURSELF: 0
SUM OF ALL RESPONSES TO PHQ QUESTIONS 1-9: 7
SUM OF ALL RESPONSES TO PHQ QUESTIONS 1-9: 2
SUM OF ALL RESPONSES TO PHQ QUESTIONS 1-9: 2
4. FEELING TIRED OR HAVING LITTLE ENERGY: 1
2. FEELING DOWN, DEPRESSED OR HOPELESS: 1

## 2023-09-06 NOTE — PATIENT INSTRUCTIONS
- Continue current medications   - Overnight fasting labs: lipid panel and CMP; Saturday mornings   -- Follow up 6 months -1 year if stable         [email or web address]

## 2023-09-06 NOTE — PROGRESS NOTES
[place] [address] [address], [address] [place] [address]  Tel: [phone]      2023     Brian Mathis (:  1990) is a 34 y.o. female, here for evaluation of the following medical concerns:  Chief Complaint   Patient presents with    Medication Check     Has run out of most meds       HPI  Patient is a 27-year-old female who presents for follow-up on depression and anxiety    Anxiety and depression   Recently ran out of medications  Taking sertraline 100 mg daily, BuSpar 15 mg twice daily and hydroxyzine 25 mg as needed. Last 2 weeks has been taking sertraline 50 mg, and hydroxyzine once daily. Ran out of Buspirone for 1 month. Has been sleeping more. Has been anxious off the medications  Sleeping 10 hours,   Patient reports that her mood has been stable on medication doses. No GI discomfort, headaches, joint pain, rash, visionary or urinary symptoms. Endorses occasional depressed mood, anxiety. Crying spells no panic attacks,  Denies any chest pain, confusion, dizziness, dry mouth, breathing difficulty, , palpitations. Denies any suicidal or homicidal ideation. Patient was given referrals to psychiatry and psychology but has trouble scheduling, looking for weekend appointment . Drinks 2 alcoholic beverages a week. Patient is still smoking 3 to 5 cigarettes daily, nicotine gum causes hiccups. Muscle spasms/aches  Has not had any flexeril, works in [employer]. Using every several days. No data recorded        2022     9:27 AM   ANITA 7 SCORE   ANITA-7 Total Score 19     Interpretation of ANITA-7 score: 5-9 = mild anxiety, 10-14 = moderate anxiety, 15+ = severe anxiety. Recommend referral to behavioral health for scores 10 or greater. Review of Systems   Respiratory:  Positive for shortness of breath (intermittent - smoking). Cardiovascular:  Negative for chest pain and palpitations. Gastrointestinal:  Negative for nausea.    Neurological:

## 2024-03-10 DIAGNOSIS — F32.A ANXIETY AND DEPRESSION: ICD-10-CM

## 2024-03-10 DIAGNOSIS — F41.9 ANXIETY AND DEPRESSION: ICD-10-CM

## 2024-03-11 RX ORDER — SERTRALINE HYDROCHLORIDE 100 MG/1
100 TABLET, FILM COATED ORAL DAILY
Qty: 90 TABLET | Refills: 1 | Status: SHIPPED | OUTPATIENT
Start: 2024-03-11

## 2024-03-11 RX ORDER — BUSPIRONE HYDROCHLORIDE 15 MG/1
15 TABLET ORAL 2 TIMES DAILY
Qty: 180 TABLET | Refills: 1 | Status: SHIPPED | OUTPATIENT
Start: 2024-03-11

## 2024-10-06 DIAGNOSIS — F32.A ANXIETY AND DEPRESSION: ICD-10-CM

## 2024-10-06 DIAGNOSIS — F41.9 ANXIETY AND DEPRESSION: ICD-10-CM

## 2024-10-07 RX ORDER — SERTRALINE HYDROCHLORIDE 100 MG/1
100 TABLET, FILM COATED ORAL DAILY
Qty: 90 TABLET | Refills: 0 | Status: SHIPPED | OUTPATIENT
Start: 2024-10-07

## 2025-01-09 DIAGNOSIS — F32.A ANXIETY AND DEPRESSION: ICD-10-CM

## 2025-01-09 DIAGNOSIS — F41.9 ANXIETY AND DEPRESSION: ICD-10-CM

## 2025-01-10 RX ORDER — SERTRALINE HYDROCHLORIDE 100 MG/1
100 TABLET, FILM COATED ORAL DAILY
Qty: 30 TABLET | Refills: 0 | Status: SHIPPED | OUTPATIENT
Start: 2025-01-10

## 2025-01-10 RX ORDER — BUSPIRONE HYDROCHLORIDE 15 MG/1
15 TABLET ORAL 2 TIMES DAILY
Qty: 60 TABLET | Refills: 0 | Status: SHIPPED | OUTPATIENT
Start: 2025-01-10

## 2025-01-23 ENCOUNTER — OFFICE VISIT (OUTPATIENT)
Dept: FAMILY MEDICINE CLINIC | Age: 35
End: 2025-01-23

## 2025-01-23 VITALS
RESPIRATION RATE: 16 BRPM | DIASTOLIC BLOOD PRESSURE: 60 MMHG | WEIGHT: 193 LBS | OXYGEN SATURATION: 97 % | HEART RATE: 84 BPM | HEIGHT: 65 IN | BODY MASS INDEX: 32.15 KG/M2 | SYSTOLIC BLOOD PRESSURE: 110 MMHG | TEMPERATURE: 98.3 F

## 2025-01-23 DIAGNOSIS — F32.A ANXIETY AND DEPRESSION: ICD-10-CM

## 2025-01-23 DIAGNOSIS — J34.89 RHINORRHEA: Primary | ICD-10-CM

## 2025-01-23 DIAGNOSIS — R51.9 NONINTRACTABLE HEADACHE, UNSPECIFIED CHRONICITY PATTERN, UNSPECIFIED HEADACHE TYPE: ICD-10-CM

## 2025-01-23 DIAGNOSIS — F41.9 ANXIETY AND DEPRESSION: ICD-10-CM

## 2025-01-23 LAB
INFLUENZA A ANTIGEN, POC: NEGATIVE
INFLUENZA B ANTIGEN, POC: NEGATIVE
LOT EXPIRE DATE: 0
LOT KIT NUMBER: 0
SARS-COV-2, POC: NORMAL
VALID INTERNAL CONTROL: NORMAL
VENDOR AND KIT NAME POC: NORMAL

## 2025-01-23 RX ORDER — FLUTICASONE PROPIONATE 50 MCG
1 SPRAY, SUSPENSION (ML) NASAL DAILY
Qty: 16 G | Refills: 0 | Status: SHIPPED | OUTPATIENT
Start: 2025-01-23

## 2025-01-23 RX ORDER — SUMATRIPTAN SUCCINATE 25 MG/1
TABLET ORAL
Qty: 9 TABLET | Refills: 5 | Status: SHIPPED | OUTPATIENT
Start: 2025-01-23

## 2025-01-23 RX ORDER — BUTALBITAL, ACETAMINOPHEN AND CAFFEINE 300; 40; 50 MG/1; MG/1; MG/1
1 CAPSULE ORAL EVERY 12 HOURS PRN
Qty: 9 CAPSULE | Refills: 0 | Status: SHIPPED | OUTPATIENT
Start: 2025-01-23

## 2025-01-23 SDOH — ECONOMIC STABILITY: FOOD INSECURITY: WITHIN THE PAST 12 MONTHS, THE FOOD YOU BOUGHT JUST DIDN'T LAST AND YOU DIDN'T HAVE MONEY TO GET MORE.: NEVER TRUE

## 2025-01-23 SDOH — ECONOMIC STABILITY: FOOD INSECURITY: WITHIN THE PAST 12 MONTHS, YOU WORRIED THAT YOUR FOOD WOULD RUN OUT BEFORE YOU GOT MONEY TO BUY MORE.: NEVER TRUE

## 2025-01-23 ASSESSMENT — PATIENT HEALTH QUESTIONNAIRE - PHQ9
SUM OF ALL RESPONSES TO PHQ QUESTIONS 1-9: 0
3. TROUBLE FALLING OR STAYING ASLEEP: NOT AT ALL
7. TROUBLE CONCENTRATING ON THINGS, SUCH AS READING THE NEWSPAPER OR WATCHING TELEVISION: NOT AT ALL
10. IF YOU CHECKED OFF ANY PROBLEMS, HOW DIFFICULT HAVE THESE PROBLEMS MADE IT FOR YOU TO DO YOUR WORK, TAKE CARE OF THINGS AT HOME, OR GET ALONG WITH OTHER PEOPLE: NOT DIFFICULT AT ALL
1. LITTLE INTEREST OR PLEASURE IN DOING THINGS: NOT AT ALL
6. FEELING BAD ABOUT YOURSELF - OR THAT YOU ARE A FAILURE OR HAVE LET YOURSELF OR YOUR FAMILY DOWN: NOT AT ALL
SUM OF ALL RESPONSES TO PHQ QUESTIONS 1-9: 0
9. THOUGHTS THAT YOU WOULD BE BETTER OFF DEAD, OR OF HURTING YOURSELF: NOT AT ALL
4. FEELING TIRED OR HAVING LITTLE ENERGY: NOT AT ALL
5. POOR APPETITE OR OVEREATING: NOT AT ALL
SUM OF ALL RESPONSES TO PHQ QUESTIONS 1-9: 0
8. MOVING OR SPEAKING SO SLOWLY THAT OTHER PEOPLE COULD HAVE NOTICED. OR THE OPPOSITE, BEING SO FIGETY OR RESTLESS THAT YOU HAVE BEEN MOVING AROUND A LOT MORE THAN USUAL: NOT AT ALL
SUM OF ALL RESPONSES TO PHQ9 QUESTIONS 1 & 2: 0
2. FEELING DOWN, DEPRESSED OR HOPELESS: NOT AT ALL
SUM OF ALL RESPONSES TO PHQ QUESTIONS 1-9: 0

## 2025-01-23 NOTE — PROGRESS NOTES
[facility]  [address]  [address]  [place], [place] [address]  Tel: [phone]      2025     Oksana Mcclain (:  1990) is a 34 y.o. female, here for evaluation of the following medical concerns:  Chief Complaint   Patient presents with    Cough    Fatigue     X 2 weeks       Migraine       HPI  Patient is a 34-year-old female who presents for Fatigue, rhinorrhea and migraine,    Had Covid over a week ago but did not get checked had alot of symptoms that have now subsided but has developed a migraine over one of her eyes and it makes her feel really nauseus. She woud like to get Covid tested to see if that is what she had  Last week her and her roommates had been sick. Now having migraines, cough and rhinorrhea,  .  Cough  This is a new (2 weeks, Has tried Aleve, Tylenol and Excedrin. Has not tried any other mgraine medications.) problem. The cough is Non-productive. Associated symptoms include headaches, postnasal drip, rhinorrhea and shortness of breath (intermittent - smoking). Pertinent negatives include no chest pain, chills, ear congestion, ear pain, eye redness, fever, heartburn, hemoptysis, nasal congestion, sore throat or sweats.   Migraine   This is a new problem. Episode onset: Nathanael night, The problem occurs constantly. The pain is located in the Left unilateral (left sided, ocular - muscle around eye) region. The pain does not radiate. The pain quality is not similar to prior headaches. The quality of the pain is described as pulsating and stabbing. The pain is at a severity of 3/10. The pain is mild. Associated symptoms include anorexia, coughing, dizziness, photophobia and rhinorrhea. Pertinent negatives include no blurred vision, ear pain, eye pain, eye redness, eye watering, fever, loss of balance, nausea, phonophobia, scalp tenderness, sore throat or vomiting. Associated symptoms comments: Aura and feeling off balance. . The symptoms are aggravated by

## 2025-01-23 NOTE — PATIENT INSTRUCTIONS
- Try antihistamine Claritin/ Loratidine or Zyrtec / cetrizine   - Use Fluticasone nasal spray  - Flonase daily 1 week along  Increase daily water intake while using expectorant. A humidifier or steam inhalation (as during a hot shower)    Headache either medication   Imitrex 25 mg as needed   Serotonin syndrome  This condition occurs when the body produces too much serotonin, a chemical that regulates mood, memory, and sleep. Symptoms include agitation,confusion, muscle twitching, sweating, shivering, diarrhea, and increased heart rate.     Fiorcet 1 pill as needed twice daily - not meant for long term use   Drowsiness   Note: Because barbiturates have been associated with medication overuse headache, transformation from episodic to chronic headache, and risk of dependence and abuse, reserve use for patients without alternative treatment options If used, limit to <=3 days per month to avoid medication overuse headache   studies have found increased risk with use of >=5 days per month

## 2025-02-13 DIAGNOSIS — F32.A ANXIETY AND DEPRESSION: ICD-10-CM

## 2025-02-13 DIAGNOSIS — F41.9 ANXIETY AND DEPRESSION: ICD-10-CM

## 2025-02-14 RX ORDER — SERTRALINE HYDROCHLORIDE 100 MG/1
100 TABLET, FILM COATED ORAL DAILY
Qty: 90 TABLET | Refills: 1 | Status: SHIPPED | OUTPATIENT
Start: 2025-02-14

## 2025-02-14 RX ORDER — BUSPIRONE HYDROCHLORIDE 15 MG/1
15 TABLET ORAL 2 TIMES DAILY
Qty: 180 TABLET | Refills: 1 | Status: SHIPPED | OUTPATIENT
Start: 2025-02-14

## 2025-02-14 NOTE — TELEPHONE ENCOUNTER
LOV 1/23/2025    Future Appointments   Date Time Provider Department Center   2/25/2025  9:20 AM Clay Siddiqui MD [facility]

## 2025-02-25 ENCOUNTER — OFFICE VISIT (OUTPATIENT)
Dept: FAMILY MEDICINE CLINIC | Age: 35
End: 2025-02-25

## 2025-02-25 ENCOUNTER — HOSPITAL ENCOUNTER (OUTPATIENT)
Dept: GENERAL RADIOLOGY | Age: 35
Discharge: HOME | End: 2025-02-25

## 2025-02-25 VITALS
HEART RATE: 71 BPM | WEIGHT: 190 LBS | DIASTOLIC BLOOD PRESSURE: 70 MMHG | BODY MASS INDEX: 31.62 KG/M2 | OXYGEN SATURATION: 95 % | RESPIRATION RATE: 16 BRPM | TEMPERATURE: 97.8 F | SYSTOLIC BLOOD PRESSURE: 120 MMHG

## 2025-02-25 DIAGNOSIS — F41.9 ANXIETY AND DEPRESSION: ICD-10-CM

## 2025-02-25 DIAGNOSIS — Z00.00 ANNUAL PHYSICAL EXAM: Primary | ICD-10-CM

## 2025-02-25 DIAGNOSIS — M79.644 FINGER PAIN, RIGHT: ICD-10-CM

## 2025-02-25 DIAGNOSIS — Z13.1 DIABETES MELLITUS SCREENING: ICD-10-CM

## 2025-02-25 DIAGNOSIS — M19.041 INFLAMMATION OF RIGHT HAND JOINT: ICD-10-CM

## 2025-02-25 DIAGNOSIS — F32.A ANXIETY AND DEPRESSION: ICD-10-CM

## 2025-02-25 DIAGNOSIS — Z11.3 SCREENING EXAMINATION FOR STI: ICD-10-CM

## 2025-02-25 DIAGNOSIS — Z72.0 TOBACCO USE: ICD-10-CM

## 2025-02-25 DIAGNOSIS — E78.2 MIXED HYPERLIPIDEMIA: ICD-10-CM

## 2025-02-25 PROCEDURE — 73140 X-RAY EXAM OF FINGER(S): CPT

## 2025-02-25 PROCEDURE — 99395 PREV VISIT EST AGE 18-39: CPT | Performed by: STUDENT IN AN ORGANIZED HEALTH CARE EDUCATION/TRAINING PROGRAM

## 2025-02-25 RX ORDER — SERTRALINE HYDROCHLORIDE 100 MG/1
100 TABLET, FILM COATED ORAL DAILY
Qty: 30 TABLET | Refills: 1 | Status: SHIPPED | OUTPATIENT
Start: 2025-02-25

## 2025-02-25 RX ORDER — SERTRALINE HYDROCHLORIDE 25 MG/1
25 TABLET, FILM COATED ORAL DAILY
Qty: 30 TABLET | Refills: 3 | Status: SHIPPED | OUTPATIENT
Start: 2025-02-25

## 2025-02-25 SDOH — ECONOMIC STABILITY: FOOD INSECURITY: WITHIN THE PAST 12 MONTHS, THE FOOD YOU BOUGHT JUST DIDN'T LAST AND YOU DIDN'T HAVE MONEY TO GET MORE.: SOMETIMES TRUE

## 2025-02-25 SDOH — ECONOMIC STABILITY: FOOD INSECURITY: WITHIN THE PAST 12 MONTHS, YOU WORRIED THAT YOUR FOOD WOULD RUN OUT BEFORE YOU GOT MONEY TO BUY MORE.: SOMETIMES TRUE

## 2025-02-25 ASSESSMENT — ANXIETY QUESTIONNAIRES
2. NOT BEING ABLE TO STOP OR CONTROL WORRYING: SEVERAL DAYS
7. FEELING AFRAID AS IF SOMETHING AWFUL MIGHT HAPPEN: SEVERAL DAYS
6. BECOMING EASILY ANNOYED OR IRRITABLE: NEARLY EVERY DAY
IF YOU CHECKED OFF ANY PROBLEMS ON THIS QUESTIONNAIRE, HOW DIFFICULT HAVE THESE PROBLEMS MADE IT FOR YOU TO DO YOUR WORK, TAKE CARE OF THINGS AT HOME, OR GET ALONG WITH OTHER PEOPLE: SOMEWHAT DIFFICULT
GAD7 TOTAL SCORE: 11
3. WORRYING TOO MUCH ABOUT DIFFERENT THINGS: NEARLY EVERY DAY
1. FEELING NERVOUS, ANXIOUS, OR ON EDGE: SEVERAL DAYS
4. TROUBLE RELAXING: SEVERAL DAYS
5. BEING SO RESTLESS THAT IT IS HARD TO SIT STILL: SEVERAL DAYS

## 2025-02-25 ASSESSMENT — ENCOUNTER SYMPTOMS
BACK PAIN: 0
BLOOD IN STOOL: 0
ABDOMINAL PAIN: 1
DIARRHEA: 0
TROUBLE SWALLOWING: 0
VOMITING: 0
SHORTNESS OF BREATH: 0
COUGH: 0
CONSTIPATION: 0
WHEEZING: 0
NAUSEA: 0

## 2025-02-25 ASSESSMENT — PATIENT HEALTH QUESTIONNAIRE - PHQ9
3. TROUBLE FALLING OR STAYING ASLEEP: NEARLY EVERY DAY
SUM OF ALL RESPONSES TO PHQ QUESTIONS 1-9: 13
SUM OF ALL RESPONSES TO PHQ QUESTIONS 1-9: 13
7. TROUBLE CONCENTRATING ON THINGS, SUCH AS READING THE NEWSPAPER OR WATCHING TELEVISION: MORE THAN HALF THE DAYS
9. THOUGHTS THAT YOU WOULD BE BETTER OFF DEAD, OR OF HURTING YOURSELF: NOT AT ALL
10. IF YOU CHECKED OFF ANY PROBLEMS, HOW DIFFICULT HAVE THESE PROBLEMS MADE IT FOR YOU TO DO YOUR WORK, TAKE CARE OF THINGS AT HOME, OR GET ALONG WITH OTHER PEOPLE: SOMEWHAT DIFFICULT
4. FEELING TIRED OR HAVING LITTLE ENERGY: SEVERAL DAYS
SUM OF ALL RESPONSES TO PHQ9 QUESTIONS 1 & 2: 3
5. POOR APPETITE OR OVEREATING: MORE THAN HALF THE DAYS
8. MOVING OR SPEAKING SO SLOWLY THAT OTHER PEOPLE COULD HAVE NOTICED. OR THE OPPOSITE, BEING SO FIGETY OR RESTLESS THAT YOU HAVE BEEN MOVING AROUND A LOT MORE THAN USUAL: SEVERAL DAYS
SUM OF ALL RESPONSES TO PHQ QUESTIONS 1-9: 13
6. FEELING BAD ABOUT YOURSELF - OR THAT YOU ARE A FAILURE OR HAVE LET YOURSELF OR YOUR FAMILY DOWN: SEVERAL DAYS
2. FEELING DOWN, DEPRESSED OR HOPELESS: NEARLY EVERY DAY
1. LITTLE INTEREST OR PLEASURE IN DOING THINGS: NOT AT ALL
SUM OF ALL RESPONSES TO PHQ QUESTIONS 1-9: 13

## 2025-02-25 NOTE — PROGRESS NOTES
of weightbearing  -Encourage healthy diet,avoid processed/refined carbohydrates   Health Maintenance Due   Topic Date Due    Varicella vaccine (1 of 2 - 13+ 2-dose series) Never done    Pneumococcal 0-49 years Vaccine (1 of 2 - PCV) Never done    Cervical cancer screen  Never done    Flu vaccine (1) 08/01/2024    COVID-19 Vaccine (2 - 2024-25 season) 09/01/2024    Diabetes screen  Never done       -     CBC; Future  -     Comprehensive Metabolic Panel; Future  -     Lipid, Fasting; Future  -     Hemoglobin A1C; Future  Anxiety and depression  Discussed, reports worsening depression.  Meds:  BuSpar 15 mg twice daily, hydroxyzine 25 mg tid as needed, sertraline 100 mg daily.  Increase sertraline to 125 mg daily\\  -     sertraline (ZOLOFT) 25 MG tablet; Take 1 tablet by mouth daily, Disp-30 tablet, R-3Normal  -     sertraline (ZOLOFT) 100 MG tablet; Take 1 tablet by mouth daily, Disp-30 tablet, R-1Normal  Mixed hyperlipidemia  - Discussed lipid goals and risks of elevated lipids. Discussed  lifestyle modifications such as low fat diet, decrease animal fats, increase fiber and exercise.   -     Comprehensive Metabolic Panel; Future  Inflammation of right hand joint  Finger pain, right  -     XR FINGER RIGHT (MIN 2 VIEWS); Future  Tobacco use  Advised patient to quit and offered support.  Educational material provided to patient.  Recommended nicotine gum, reviewed use and dosing.  Screening examination for STI  -     C.trachomatis N.gonorrhoeae DNA, Urine  -     Syphilis Antibody Cascading Reflex; Future  -     HIV Screen; Future  -     Vaginitis Panel PCR; Future    Diabetes mellitus screening         Prior to Visit Medications    Medication Sig Taking? Authorizing Provider   busPIRone (BUSPAR) 15 MG tablet TAKE 15 MG BY MOUTH IN THE MORNING AND AT BEDTIME Yes Clay Siddiqui MD   sertraline (ZOLOFT) 100 MG tablet TAKE 1 TABLET BY MOUTH EVERY DAY Yes Clay Siddiqui MD   fluticasone (FLONASE) 50 MCG/ACT nasal spray 1

## 2025-02-25 NOTE — PATIENT INSTRUCTIONS
Increase Zoloft 125 mg daily   -Recommend 150 minutes of cardiovascular activity a week, or 10,000 to 15,000 steps a day, 2 days of weightbearing  -Encourage healthy diet,avoid processed/refined carbohydrates   - Fasting labs 8-12 hours    - Urine   - Xray finger   Ibuprofen 400-600 mg 3 times daily   Health Maintenance Due   Topic Date Due    Varicella vaccine (1 of 2 - 13+ 2-dose series) Never done    Pneumococcal 0-49 years Vaccine (1 of 2 - PCV) Never done    Cervical cancer screen  Never done    Flu vaccine (1) 08/01/2024    COVID-19 Vaccine (2 - 2024-25 season) 09/01/2024    Diabetes screen  Never done             [employer]   Speak to a trained professional 24/7 who can connect you to essential community services including food, clothing, transportation, housing, utilities, employment services, childcare, and baby supplies. 211 serves nationwide.  [employer] for resources in [facility] and [facility] in [place]; [facility], [place], and [facility] in [place].   [employer]/resources for resources in [facility], [facility], [place], [place], [facility], [place], [place], and [facility] in [place].    [employer] [place]   What they offer: [employer] is a nationwide network of food sun, food pantries, and meal programs.  Website: https://www.feeding[place].org/find-your-local-foodbank      [place] [employer]ger Hotline  What they offer: The hotline is a resource for individuals and families seeking information on how and where to obtain food.   Website:  https://www.hungerfreeamerica.org/en-[place]-national-hunger-hotline    Hunger Hotline Phone Number: 6-764-1-FRANCES (1-631.476.2524)  Hours of Operation: Monday - Friday 7am to 10pm   The Hunger Hotline also operates a texting service. Our number is 090-438-7428. Please note that these are automated texts and we do not reply or monitor texts.   [employer] [place]  What they offer: $1 for $1

## 2025-02-26 LAB
BV BACTERIA DNA VAG QL NAA+PROBE: NOT DETECTED
C GLABRATA DNA VAG QL NAA+PROBE: NORMAL
C GLABRATA DNA VAG QL NAA+PROBE: NOT DETECTED
C KRUSEI DNA VAG QL NAA+PROBE: NOT DETECTED
C TRACH DNA UR QL NAA+PROBE: NEGATIVE
CANDIDA DNA VAG QL NAA+PROBE: NOT DETECTED
N GONORRHOEA DNA UR QL NAA+PROBE: NEGATIVE
T VAGINALIS DNA VAG QL NAA+PROBE: NOT DETECTED

## 2025-03-04 ENCOUNTER — TELEPHONE (OUTPATIENT)
Dept: FAMILY MEDICINE CLINIC | Age: 35
End: 2025-03-04

## 2025-03-04 NOTE — TELEPHONE ENCOUNTER
Pt called stating she went to  her Zoloft 25 and 100 mg and [facility] did not have it. Pt is asking that the scripts be resent to them.     Please advise.       2/25/2025 last OV - No showed    No future appointments.

## 2025-03-04 NOTE — TELEPHONE ENCOUNTER
Left VM for patient. Pharmacy does have medication. They had to put medication back due to it being over 14 days with no .

## 2025-03-05 ENCOUNTER — TELEPHONE (OUTPATIENT)
Dept: FAMILY MEDICINE CLINIC | Age: 35
End: 2025-03-05

## 2025-03-05 DIAGNOSIS — F32.A ANXIETY AND DEPRESSION: ICD-10-CM

## 2025-03-05 DIAGNOSIS — F41.9 ANXIETY AND DEPRESSION: ICD-10-CM

## 2025-03-05 NOTE — TELEPHONE ENCOUNTER
Patient is calling in asking for the medication to be send over to the pharmacy 30 day supply    Medication: sertraline (ZOLOFT) 100 MG tablet and sertraline (ZOLOFT) 25 MG tablet       Pharmacy: [facility]     Last Visit: 2/25/2025    No future appointments.

## 2025-03-05 NOTE — TELEPHONE ENCOUNTER
Left Detailed message for patient to [facility] pharmacy and ask them to get medication ready as they do have refills on them.

## 2025-03-06 RX ORDER — SERTRALINE HYDROCHLORIDE 25 MG/1
25 TABLET, FILM COATED ORAL DAILY
Qty: 90 TABLET | Refills: 0 | Status: SHIPPED | OUTPATIENT
Start: 2025-03-06

## 2025-03-06 RX ORDER — SERTRALINE HYDROCHLORIDE 100 MG/1
100 TABLET, FILM COATED ORAL DAILY
Qty: 90 TABLET | Refills: 0 | Status: SHIPPED | OUTPATIENT
Start: 2025-03-06

## 2025-03-17 DIAGNOSIS — J34.89 RHINORRHEA: ICD-10-CM

## 2025-03-18 RX ORDER — FLUTICASONE PROPIONATE 50 MCG
SPRAY, SUSPENSION (ML) NASAL
Qty: 1 EACH | Refills: 1 | Status: SHIPPED | OUTPATIENT
Start: 2025-03-18

## 2025-06-01 DIAGNOSIS — F32.A ANXIETY AND DEPRESSION: ICD-10-CM

## 2025-06-01 DIAGNOSIS — F41.9 ANXIETY AND DEPRESSION: ICD-10-CM

## 2025-06-02 RX ORDER — SERTRALINE HYDROCHLORIDE 100 MG/1
100 TABLET, FILM COATED ORAL DAILY
Qty: 90 TABLET | Refills: 1 | Status: SHIPPED | OUTPATIENT
Start: 2025-06-02

## 2025-06-02 RX ORDER — SERTRALINE HYDROCHLORIDE 25 MG/1
25 TABLET, FILM COATED ORAL DAILY
Qty: 90 TABLET | Refills: 1 | Status: SHIPPED | OUTPATIENT
Start: 2025-06-02